# Patient Record
Sex: MALE | URBAN - METROPOLITAN AREA
[De-identification: names, ages, dates, MRNs, and addresses within clinical notes are randomized per-mention and may not be internally consistent; named-entity substitution may affect disease eponyms.]

---

## 2017-11-01 NOTE — PATIENT DISCUSSION
Cataracts are visually significant and patient is ready to consider surgery; anticipate improved visual acuity but no guarantee of outcome; refer for consult with [facility].

## 2017-11-08 ENCOUNTER — IMPORTED ENCOUNTER (OUTPATIENT)
Dept: URBAN - METROPOLITAN AREA CLINIC 43 | Facility: CLINIC | Age: 67
End: 2017-11-08

## 2017-11-08 PROBLEM — H25.012: Noted: 2017-11-08

## 2017-11-08 PROBLEM — H25.13: Noted: 2017-11-08

## 2018-11-15 NOTE — PATIENT DISCUSSION
The cataracts are responsible for the patient's decrease in vision and is currently having more trouble with small print.  Ed can try more light but cataract OS is causing attenuation of light vs OD.

## 2019-01-15 ENCOUNTER — IMPORTED ENCOUNTER (OUTPATIENT)
Dept: URBAN - METROPOLITAN AREA CLINIC 43 | Facility: CLINIC | Age: 69
End: 2019-01-15

## 2019-01-15 ENCOUNTER — PREPPED CHART (OUTPATIENT)
Dept: URBAN - METROPOLITAN AREA CLINIC 32 | Facility: CLINIC | Age: 69
End: 2019-01-15

## 2019-01-15 PROBLEM — H35.362: Noted: 2019-01-15

## 2019-01-15 PROBLEM — H25.13: Noted: 2019-01-15

## 2019-01-28 NOTE — PATIENT DISCUSSION
The patient was informed that with the Basic + option, they will most likely need prescription glasses at all focal points after surgery. The patient elects Basic + OS, goal of emmetropia.

## 2019-02-12 NOTE — PROCEDURE NOTE: SURGICAL
"<span style=""font-weight:bold;"">MR #:</span> [id number]<br /><br /><span style=""font-weight:bold;"">PREOPERATIVE DIAGNOSIS:</span> Cataract

## 2019-11-21 NOTE — PATIENT DISCUSSION
Recommended artificial tears to use: 1 drop 4x a day in both eyes.  target AT use when reading (prophy).

## 2020-01-15 ASSESSMENT — TONOMETRY
OS_IOP_MMHG: 13
OD_IOP_MMHG: 12

## 2020-01-15 ASSESSMENT — VISUAL ACUITY
OD_CC: 20/30
OS_SC: J2
OD_SC: 20/50
OS_SC: 20/80
OD_SC: J1
OS_CC: 20/30

## 2020-01-16 ENCOUNTER — ESTABLISHED PATIENT (OUTPATIENT)
Dept: URBAN - METROPOLITAN AREA CLINIC 32 | Facility: CLINIC | Age: 70
End: 2020-01-16

## 2020-01-16 DIAGNOSIS — H25.13: ICD-10-CM

## 2020-01-16 PROCEDURE — 92014 COMPRE OPH EXAM EST PT 1/>: CPT

## 2020-01-16 ASSESSMENT — KERATOMETRY
OS_K2POWER_DIOPTERS: 40.25
OS_AXISANGLE2_DEGREES: 167
OS_K1POWER_DIOPTERS: 39.75
OD_AXISANGLE2_DEGREES: 179
OD_K2POWER_DIOPTERS: 41.5
OD_K1POWER_DIOPTERS: 40.25
OS_AXISANGLE_DEGREES: 77
OD_AXISANGLE_DEGREES: 89

## 2020-01-16 ASSESSMENT — VISUAL ACUITY
OD_CC: 20/40
OS_CC: 20/40
OS_SC: J2
OD_SC: J1

## 2020-01-16 ASSESSMENT — TONOMETRY
OS_IOP_MMHG: 11
OD_IOP_MMHG: 11

## 2020-04-19 ASSESSMENT — VISUAL ACUITY
OD_SC: 20/50 -2
OD_SC: 20/50
OD_CC: J2
OS_OTHER: 20/60.
OD_OTHER: 20/50.
OS_CC: 20/40
OS_CC: J1
OD_CC: 20/30
OS_SC: 20/80 -2
OS_SC: 20/200
OD_OTHER: 20/80.
OD_CC: 20/40
OS_OTHER: 20/80.
OS_CC: 20/40
OS_SC: J2
OD_SC: J1

## 2020-04-19 ASSESSMENT — KERATOMETRY
OS_K1POWER_DIOPTERS: 39.75
OS_K2POWER_DIOPTERS: 40.25
OD_AXISANGLE_DEGREES: 84
OS_AXISANGLE_DEGREES: 74
OS_AXISANGLE2_DEGREES: 164
OS_AXISANGLE_DEGREES: 160
OD_K1POWER_DIOPTERS: 40.25
OD_AXISANGLE_DEGREES: 180
OS_K1POWER_DIOPTERS: 40.25
OD_K2POWER_DIOPTERS: 40
OS_K2POWER_DIOPTERS: 39.5
OS_AXISANGLE2_DEGREES: 70
OD_AXISANGLE2_DEGREES: 174
OD_K2POWER_DIOPTERS: 41.25
OD_AXISANGLE2_DEGREES: 90
OD_K1POWER_DIOPTERS: 41.25

## 2020-04-19 ASSESSMENT — TONOMETRY
OD_IOP_MMHG: 15.0
OS_IOP_MMHG: 12.0
OS_IOP_MMHG: 13.0
OD_IOP_MMHG: 12.0

## 2021-05-04 ENCOUNTER — ESTABLISHED COMPREHENSIVE EXAM (OUTPATIENT)
Dept: URBAN - METROPOLITAN AREA CLINIC 32 | Facility: CLINIC | Age: 71
End: 2021-05-04

## 2021-05-04 DIAGNOSIS — H25.13: ICD-10-CM

## 2021-05-04 PROCEDURE — 92014 COMPRE OPH EXAM EST PT 1/>: CPT

## 2021-05-04 PROCEDURE — 92015 DETERMINE REFRACTIVE STATE: CPT

## 2021-05-04 ASSESSMENT — KERATOMETRY
OD_K2POWER_DIOPTERS: 41.75
OS_K2POWER_DIOPTERS: 40.50
OD_AXISANGLE2_DEGREES: 169
OS_K1POWER_DIOPTERS: 39.75
OD_AXISANGLE_DEGREES: 79
OD_K1POWER_DIOPTERS: 40.50
OS_AXISANGLE_DEGREES: 78
OS_AXISANGLE2_DEGREES: 168

## 2021-05-04 ASSESSMENT — TONOMETRY
OD_IOP_MMHG: 12
OS_IOP_MMHG: 12

## 2021-05-04 ASSESSMENT — VISUAL ACUITY
OS_CC: 20/50+1
OS_SC: J2
OD_CC: 20/40+1
OS_PH: 20/25-1
OD_SC: J2

## 2022-06-28 ASSESSMENT — KERATOMETRY
OD_AXISANGLE2_DEGREES: 169
OS_AXISANGLE_DEGREES: 78
OS_K1POWER_DIOPTERS: 39.75
OD_K2POWER_DIOPTERS: 41.75
OD_AXISANGLE_DEGREES: 79
OS_AXISANGLE2_DEGREES: 168
OD_K1POWER_DIOPTERS: 40.50
OS_K2POWER_DIOPTERS: 40.50

## 2022-06-29 ENCOUNTER — COMPREHENSIVE EXAM (OUTPATIENT)
Dept: URBAN - METROPOLITAN AREA CLINIC 32 | Facility: CLINIC | Age: 72
End: 2022-06-29

## 2022-06-29 DIAGNOSIS — H25.13: ICD-10-CM

## 2022-06-29 DIAGNOSIS — H35.363: ICD-10-CM

## 2022-06-29 DIAGNOSIS — H25.012: ICD-10-CM

## 2022-06-29 PROCEDURE — 92014 COMPRE OPH EXAM EST PT 1/>: CPT

## 2022-06-29 PROCEDURE — 92134 CPTRZ OPH DX IMG PST SGM RTA: CPT

## 2022-06-29 ASSESSMENT — VISUAL ACUITY
OD_CC: 20/25
OS_CC: 20/30

## 2022-06-29 ASSESSMENT — TONOMETRY
OS_IOP_MMHG: 12
OD_IOP_MMHG: 12

## 2023-07-11 ENCOUNTER — COMPREHENSIVE EXAM (OUTPATIENT)
Dept: URBAN - METROPOLITAN AREA CLINIC 32 | Facility: CLINIC | Age: 73
End: 2023-07-11

## 2023-07-11 DIAGNOSIS — H35.363: ICD-10-CM

## 2023-07-11 DIAGNOSIS — H25.012: ICD-10-CM

## 2023-07-11 DIAGNOSIS — H16.223: ICD-10-CM

## 2023-07-11 DIAGNOSIS — H25.13: ICD-10-CM

## 2023-07-11 PROCEDURE — 92014 COMPRE OPH EXAM EST PT 1/>: CPT

## 2023-07-11 ASSESSMENT — KERATOMETRY
OS_AXISANGLE2_DEGREES: 79
OS_K2POWER_DIOPTERS: 39.25
OS_AXISANGLE_DEGREES: 169
OD_AXISANGLE_DEGREES: 178
OS_K1POWER_DIOPTERS: 40.50
OD_K2POWER_DIOPTERS: 40.00
OD_K1POWER_DIOPTERS: 41.75
OD_AXISANGLE2_DEGREES: 88

## 2023-07-11 ASSESSMENT — VISUAL ACUITY
OD_CC: 20/25-1
OS_SC: 20/200
OS_GLARE: 20/60
OD_SC: J2
OS_CC: 20/25
OD_SC: 20/50
OD_GLARE: 20/40
OS_SC: J3

## 2023-07-11 ASSESSMENT — TONOMETRY
OD_IOP_MMHG: 11
OS_IOP_MMHG: 11

## 2024-07-09 ENCOUNTER — COMPREHENSIVE EXAM (OUTPATIENT)
Dept: URBAN - METROPOLITAN AREA CLINIC 32 | Facility: CLINIC | Age: 74
End: 2024-07-09

## 2024-07-09 DIAGNOSIS — H25.013: ICD-10-CM

## 2024-07-09 DIAGNOSIS — H16.223: ICD-10-CM

## 2024-07-09 DIAGNOSIS — H35.033: ICD-10-CM

## 2024-07-09 DIAGNOSIS — H35.363: ICD-10-CM

## 2024-07-09 DIAGNOSIS — H25.13: ICD-10-CM

## 2024-07-09 PROCEDURE — 92015 DETERMINE REFRACTIVE STATE: CPT

## 2024-07-09 PROCEDURE — 92250 FUNDUS PHOTOGRAPHY W/I&R: CPT

## 2024-07-09 PROCEDURE — 92014 COMPRE OPH EXAM EST PT 1/>: CPT

## 2024-07-09 ASSESSMENT — VISUAL ACUITY
OS_SC: J2-1
OD_GLARE: 20/50
OD_CC: 20/25-2
OS_CC: 20/40
OD_SC: J3
OS_SC: 20/80-1
OS_GLARE: 20/70
OD_SC: 20/70+2

## 2024-07-09 ASSESSMENT — TONOMETRY
OS_IOP_MMHG: 14
OD_IOP_MMHG: 14

## 2024-07-09 ASSESSMENT — KERATOMETRY
OD_AXISANGLE2_DEGREES: 90
OS_K1POWER_DIOPTERS: 40.75
OS_AXISANGLE_DEGREES: 170
OS_AXISANGLE2_DEGREES: 80
OD_K1POWER_DIOPTERS: 41.75
OD_AXISANGLE_DEGREES: 180
OS_K2POWER_DIOPTERS: 39.75
OD_K2POWER_DIOPTERS: 40.25